# Patient Record
Sex: FEMALE | Race: WHITE | Employment: PART TIME | URBAN - METROPOLITAN AREA
[De-identification: names, ages, dates, MRNs, and addresses within clinical notes are randomized per-mention and may not be internally consistent; named-entity substitution may affect disease eponyms.]

---

## 2020-03-08 ENCOUNTER — HOSPITAL ENCOUNTER (OUTPATIENT)
Age: 55
Discharge: HOME | End: 2020-03-08
Payer: MEDICAID

## 2020-03-08 VITALS
HEART RATE: 105 BPM | OXYGEN SATURATION: 100 % | TEMPERATURE: 98 F | RESPIRATION RATE: 16 BRPM | DIASTOLIC BLOOD PRESSURE: 77 MMHG | SYSTOLIC BLOOD PRESSURE: 104 MMHG

## 2020-03-08 DIAGNOSIS — H65.02 NON-RECURRENT ACUTE SEROUS OTITIS MEDIA OF LEFT EAR: Primary | ICD-10-CM

## 2020-03-08 LAB
POCT INFLUENZA A: POSITIVE
POCT INFLUENZA B: NEGATIVE
POCT RAPID STREP: NEGATIVE

## 2020-03-08 PROCEDURE — 87081 CULTURE SCREEN ONLY: CPT | Performed by: NURSE PRACTITIONER

## 2020-03-08 PROCEDURE — 99203 OFFICE O/P NEW LOW 30 MIN: CPT

## 2020-03-08 PROCEDURE — 87502 INFLUENZA DNA AMP PROBE: CPT | Performed by: NURSE PRACTITIONER

## 2020-03-08 PROCEDURE — 99204 OFFICE O/P NEW MOD 45 MIN: CPT

## 2020-03-08 PROCEDURE — 87430 STREP A AG IA: CPT | Performed by: NURSE PRACTITIONER

## 2020-03-08 RX ORDER — AMOXICILLIN 875 MG/1
875 TABLET, COATED ORAL 2 TIMES DAILY
Qty: 20 TABLET | Refills: 0 | Status: SHIPPED | OUTPATIENT
Start: 2020-03-08 | End: 2020-03-18

## 2020-03-08 NOTE — ED PROVIDER NOTES
Patient Seen in: [id number] [facility]      History   Patient presents with:  Ear Pain  Fever    Stated Complaint: headache and ear pain    28-year-old female who presents to the immediate care with complaints of generalized body aches chills, file             Review of Systems   Constitutional: Positive for chills and fever. HENT: Positive for congestion and ear pain. Musculoskeletal: Positive for myalgias. Psychiatric/Behavioral: Negative. All other systems reviewed and are negative. viral RNA.    POCT RAPID STREP - Normal   GRP A STREP CULT, THROAT                  MDM     I have discussed with the patient and/or family the results of test, differential diagnosis, treatment plan, warning signs and symptoms which should prompt immediate

## 2020-03-08 NOTE — ED NOTES
Upon d/c, patient asked if she could be tested for flu. Now has aches, chills, headache, and nausea. Provider notified.

## 2020-03-26 ENCOUNTER — APPOINTMENT (OUTPATIENT)
Dept: GENERAL RADIOLOGY | Age: 55
End: 2020-03-26
Attending: NURSE PRACTITIONER
Payer: MEDICAID

## 2020-03-26 ENCOUNTER — HOSPITAL ENCOUNTER (EMERGENCY)
Age: 55
Discharge: HOME | End: 2020-03-26
Attending: EMERGENCY MEDICINE
Payer: MEDICAID

## 2020-03-26 VITALS
HEIGHT: 62 IN | DIASTOLIC BLOOD PRESSURE: 79 MMHG | WEIGHT: 150 LBS | HEART RATE: 99 BPM | TEMPERATURE: 98 F | SYSTOLIC BLOOD PRESSURE: 118 MMHG | OXYGEN SATURATION: 100 % | RESPIRATION RATE: 20 BRPM | BODY MASS INDEX: 27.6 KG/M2

## 2020-03-26 DIAGNOSIS — J22 LOWER RESPIRATORY INFECTION: Primary | ICD-10-CM

## 2020-03-26 PROCEDURE — 99283 EMERGENCY DEPT VISIT LOW MDM: CPT

## 2020-03-26 PROCEDURE — 71045 X-RAY EXAM CHEST 1 VIEW: CPT | Performed by: NURSE PRACTITIONER

## 2020-03-26 RX ORDER — ALBUTEROL SULFATE 90 UG/1
2 AEROSOL, METERED RESPIRATORY (INHALATION) EVERY 4 HOURS PRN
Qty: 1 INHALER | Refills: 0 | Status: SHIPPED | OUTPATIENT
Start: 2020-03-26 | End: 2020-04-25

## 2020-03-26 RX ORDER — CODEINE PHOSPHATE AND GUAIFENESIN 10; 100 MG/5ML; MG/5ML
5 SOLUTION ORAL EVERY 6 HOURS PRN
Qty: 180 ML | Refills: 0 | Status: SHIPPED | OUTPATIENT
Start: 2020-03-26 | End: 2021-11-30 | Stop reason: ALTCHOICE

## 2020-03-26 RX ORDER — AZITHROMYCIN 250 MG/1
TABLET, FILM COATED ORAL
Qty: 1 PACKAGE | Refills: 0 | Status: SHIPPED | OUTPATIENT
Start: 2020-03-26 | End: 2020-03-31

## 2020-03-26 NOTE — ED PROVIDER NOTES
Patient Seen in: [facility] Emergency Department In [place]      History   Patient presents with:  Cough/URI  Fever    Stated Complaint: Diagnosed with Influenza A 3/8. Cough is worse. Low grade fever.  Sent per pcp    71-year-old female presents today wit ENDOSCOPY,DIAGNOSIS  6/30/14    Alexian, esophagitis, gastric polyps, esophageal and gastric bx negative                    Social History    Tobacco Use      Smoking status: Never Smoker      Smokeless tobacco: Never Used    Alcohol use: No    Drug use: N Portable  (cpt=71045)    Result Date: 3/26/2020  PROCEDURE:  XR CHEST AP PORTABLE  (CPT=71045)  TECHNIQUE:  AP chest radiograph was obtained.   COMPARISON:  JUNI CAMERON, CHEST PA   LATERAL, 10/06/2008, 12:05 PM.  INDICATIONS:  Diagnosed with Influenza A [facility] [employer] [facility]  Karlos  [address] [facility]  [phone]  In 2 weeks  As needed        Medications Prescribed:  Current Discharge Medication List    START taking these medications    azithromycin (Inchelium Prima

## (undated) NOTE — LETTER
Date & Time: 3/8/2020, 11:03 AM  Patient: Chris Benavidez  Encounter Provider(s):    ARNOLDO Bond       To Whom It May Concern:    Prudence Hernandez was seen and treated in our department on 3/8/2020. She should not return to work until 3/10/20.     If y

## (undated) NOTE — LETTER
Date & Time: 3/10/2020, 4:43 PM  Patient: Rudy Ott  Encounter Provider(s):    ARNOLDO Anaya       To Whom It May Concern:    Kaila Awan was seen and treated in our department on 3/8/2020.  She should not return to work until Thursday, March